# Patient Record
Sex: FEMALE | Race: AMERICAN INDIAN OR ALASKA NATIVE | URBAN - METROPOLITAN AREA
[De-identification: names, ages, dates, MRNs, and addresses within clinical notes are randomized per-mention and may not be internally consistent; named-entity substitution may affect disease eponyms.]

---

## 2020-04-30 ENCOUNTER — APPOINTMENT (RX ONLY)
Dept: URBAN - METROPOLITAN AREA CLINIC 158 | Facility: CLINIC | Age: 60
Setting detail: DERMATOLOGY
End: 2020-04-30

## 2020-04-30 DIAGNOSIS — L92.0 GRANULOMA ANNULARE: ICD-10-CM

## 2020-04-30 DIAGNOSIS — L40.3 PUSTULOSIS PALMARIS ET PLANTARIS: ICD-10-CM

## 2020-04-30 DIAGNOSIS — L40.0 PSORIASIS VULGARIS: ICD-10-CM

## 2020-04-30 PROCEDURE — ? COUNSELING

## 2020-04-30 PROCEDURE — 99202 OFFICE O/P NEW SF 15 MIN: CPT

## 2020-04-30 PROCEDURE — ? PATIENT SPECIFIC COUNSELING

## 2020-04-30 PROCEDURE — ? PRESCRIPTION

## 2020-04-30 PROCEDURE — ? EDUCATIONAL RESOURCES PROVIDED

## 2020-04-30 RX ORDER — HALOBETASOL PROPIONATE AND TAZAROTENE .1; .45 MG/G; MG/G
1 LOTION TOPICAL ONCE A DAY
Qty: 1 | Refills: 3 | Status: ERX | COMMUNITY
Start: 2020-04-30

## 2020-04-30 RX ADMIN — HALOBETASOL PROPIONATE AND TAZAROTENE 1: .1; .45 LOTION TOPICAL at 00:00

## 2020-04-30 ASSESSMENT — PGA PSORIASIS: PGA PSORIASIS 2020: MILD

## 2020-04-30 ASSESSMENT — LOCATION DETAILED DESCRIPTION DERM
LOCATION DETAILED: LEFT HYPOTHENAR EMINENCE
LOCATION DETAILED: RIGHT RADIAL DORSAL HAND
LOCATION DETAILED: RIGHT ULNAR DORSAL HAND
LOCATION DETAILED: RIGHT DORSAL FOOT
LOCATION DETAILED: LEFT THENAR EMINENCE
LOCATION DETAILED: RIGHT MEDIAL PLANTAR MIDFOOT
LOCATION DETAILED: RIGHT HYPOTHENAR EMINENCE
LOCATION DETAILED: RIGHT ULNAR PALM
LOCATION DETAILED: LEFT RADIAL PALM
LOCATION DETAILED: RIGHT INSTEP
LOCATION DETAILED: RIGHT THENAR EMINENCE
LOCATION DETAILED: LEFT ULNAR PALM
LOCATION DETAILED: RIGHT RADIAL PALM

## 2020-04-30 ASSESSMENT — BSA PSORIASIS: % BODY COVERED IN PSORIASIS: 4

## 2020-04-30 ASSESSMENT — LOCATION ZONE DERM
LOCATION ZONE: FEET
LOCATION ZONE: HAND

## 2020-04-30 ASSESSMENT — LOCATION SIMPLE DESCRIPTION DERM
LOCATION SIMPLE: RIGHT HAND
LOCATION SIMPLE: RIGHT PLANTAR SURFACE
LOCATION SIMPLE: LEFT HAND
LOCATION SIMPLE: RIGHT FOOT

## 2020-04-30 NOTE — PROCEDURE: PATIENT SPECIFIC COUNSELING
Detail Level: Detailed
She may use Duobrii lotion on GA on the right dorsal hand as well.
Patient reports that rash on hands and feet has been ongoing for over 1 year and has gotten progressively worse.  Condition has not been responsive to various OTCs including antifungal and silvadene. She has not had any prescription treatment.  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  I recommend start treatment with Duobrii lotion.  Samples given and prescription will be sent to [facility] pharmacy.  Return to clinic in 1 month.
There is one small psoriasis patch on the right dorsal foot. She may apply the Duobrii lotion to this site as well.

## 2020-05-28 ENCOUNTER — APPOINTMENT (RX ONLY)
Dept: URBAN - METROPOLITAN AREA CLINIC 158 | Facility: CLINIC | Age: 60
Setting detail: DERMATOLOGY
End: 2020-05-28

## 2020-05-28 DIAGNOSIS — L92.0 GRANULOMA ANNULARE: ICD-10-CM

## 2020-05-28 DIAGNOSIS — L40.0 PSORIASIS VULGARIS: ICD-10-CM

## 2020-05-28 DIAGNOSIS — L40.3 PUSTULOSIS PALMARIS ET PLANTARIS: ICD-10-CM | Status: INADEQUATELY CONTROLLED

## 2020-05-28 PROCEDURE — ? PATIENT SPECIFIC COUNSELING

## 2020-05-28 PROCEDURE — ? ORDER TESTS

## 2020-05-28 PROCEDURE — ? EDUCATIONAL RESOURCES PROVIDED

## 2020-05-28 PROCEDURE — ? COUNSELING

## 2020-05-28 PROCEDURE — 99213 OFFICE O/P EST LOW 20 MIN: CPT

## 2020-05-28 PROCEDURE — ? PRESCRIPTION

## 2020-05-28 ASSESSMENT — LOCATION ZONE DERM
LOCATION ZONE: FEET
LOCATION ZONE: HAND

## 2020-05-28 ASSESSMENT — LOCATION DETAILED DESCRIPTION DERM
LOCATION DETAILED: LEFT ULNAR PALM
LOCATION DETAILED: RIGHT ULNAR DORSAL HAND
LOCATION DETAILED: RIGHT ULNAR PALM
LOCATION DETAILED: RIGHT MEDIAL PLANTAR MIDFOOT
LOCATION DETAILED: RIGHT HYPOTHENAR EMINENCE
LOCATION DETAILED: LEFT HYPOTHENAR EMINENCE
LOCATION DETAILED: LEFT RADIAL PALM
LOCATION DETAILED: RIGHT RADIAL DORSAL HAND
LOCATION DETAILED: RIGHT INSTEP
LOCATION DETAILED: RIGHT RADIAL PALM
LOCATION DETAILED: LEFT THENAR EMINENCE
LOCATION DETAILED: RIGHT DORSAL FOOT
LOCATION DETAILED: RIGHT THENAR EMINENCE

## 2020-05-28 ASSESSMENT — LOCATION SIMPLE DESCRIPTION DERM
LOCATION SIMPLE: RIGHT FOOT
LOCATION SIMPLE: RIGHT PLANTAR SURFACE
LOCATION SIMPLE: LEFT HAND
LOCATION SIMPLE: RIGHT HAND

## 2020-05-28 NOTE — PROCEDURE: PATIENT SPECIFIC COUNSELING
Carry forward from 4/30/2020: Patient reports that rash on hands and feet has been ongoing for over 1 year and has gotten progressively worse.  Condition has not been responsive to various OTCs including antifungal and silvadene. She has not had any prescription treatment.  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  I recommend start treatment with Duobrii lotion.  Samples given and prescription will be sent to [facility] pharmacy.  Return to clinic in 1 month.\\n\\nTADELE's note (May 28 2020):  PPP, psoriasis, and GA on her hands and feet are minimally better with Duobrii lotion over the past month.  I recommend starting treatment with acitretin 25mg QD.  Patient report that she recently had blood test ordered by her PCP.  She states that he cholesterol and TGs are mild elevated and that she has been told that she has a fatty liver.  I will have patient sign a medical release for her PCP to get most recent CBC, CMP, and lipid panel (for baseline).  I will have her get hepatic panel and LFTs in 2-3 week after starting acitretin.  Return to clinic in 1 month.
Detail Level: Detailed
Carry forward from 4/30/2020: She may use Duobrii lotion on GA on the right dorsal hand as well.
Carry forward from 4/30/2020: There is one small psoriasis patch on the right dorsal foot. She may apply the Duobrii lotion to this site as well.

## 2020-06-25 ENCOUNTER — APPOINTMENT (RX ONLY)
Dept: URBAN - METROPOLITAN AREA CLINIC 158 | Facility: CLINIC | Age: 60
Setting detail: DERMATOLOGY
End: 2020-06-25

## 2020-06-25 DIAGNOSIS — L40.3 PUSTULOSIS PALMARIS ET PLANTARIS: ICD-10-CM

## 2020-06-25 DIAGNOSIS — L92.0 GRANULOMA ANNULARE: ICD-10-CM

## 2020-06-25 PROCEDURE — 99213 OFFICE O/P EST LOW 20 MIN: CPT

## 2020-06-25 PROCEDURE — ? PATIENT SPECIFIC COUNSELING

## 2020-06-25 PROCEDURE — ? ORDER TESTS

## 2020-06-25 PROCEDURE — ? COUNSELING

## 2020-06-25 ASSESSMENT — LOCATION ZONE DERM
LOCATION ZONE: FEET
LOCATION ZONE: HAND

## 2020-06-25 ASSESSMENT — LOCATION SIMPLE DESCRIPTION DERM
LOCATION SIMPLE: RIGHT HAND
LOCATION SIMPLE: LEFT HAND
LOCATION SIMPLE: RIGHT PLANTAR SURFACE

## 2020-06-25 ASSESSMENT — LOCATION DETAILED DESCRIPTION DERM
LOCATION DETAILED: LEFT ULNAR PALM
LOCATION DETAILED: LEFT THENAR EMINENCE
LOCATION DETAILED: RIGHT ULNAR DORSAL HAND
LOCATION DETAILED: LEFT RADIAL PALM
LOCATION DETAILED: RIGHT THENAR EMINENCE
LOCATION DETAILED: RIGHT INSTEP
LOCATION DETAILED: RIGHT RADIAL DORSAL HAND
LOCATION DETAILED: RIGHT HYPOTHENAR EMINENCE
LOCATION DETAILED: RIGHT ULNAR PALM
LOCATION DETAILED: RIGHT RADIAL PALM
LOCATION DETAILED: RIGHT MEDIAL PLANTAR MIDFOOT
LOCATION DETAILED: LEFT HYPOTHENAR EMINENCE

## 2020-06-25 NOTE — PROCEDURE: PATIENT SPECIFIC COUNSELING
Carry forward from 4/30/2020: Patient reports that rash on hands and feet has been ongoing for over 1 year and has gotten progressively worse.  Condition has not been responsive to various OTCs including antifungal and silvadene. She has not had any prescription treatment.  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  I recommend start treatment with Duobrii lotion.  Samples given and prescription will be sent to [facility] pharmacy.  Return to clinic in 1 month.\\n\\nCarry forward from (May 28 2020):  PPP, psoriasis, and GA on her hands and feet are minimally better with Duobrii lotion over the past month.  I recommend starting treatment with acitretin 25mg QD.  Patient report that she recently had blood test ordered by her PCP.  She states that he cholesterol and TGs are mild elevated and that she has been told that she has a fatty liver.  I will have patient sign a medical release for her PCP to get most recent CBC, CMP, and lipid panel (for baseline).  I will have her get hepatic panel and LFTs in 2-3 week after starting acitretin.  Return to clinic in 1 month.\\n\\nPhone conversation with patient on 6/23/2020: I talked with patient regarding her blood test results which she had drawn on 6/18/2020 that showed TG level of 1133. She actually had stopped the medication the day after she got blood test because of extreme chapped lips and dry hands. She has not had any nausea, vomiting, or severe abdominal pain. However, she had been feeling \"blah\" and fatigue. I told her to remain off the acitretin. We will have to recheck her TG next week to ensure that it is trending down. I asked her to decrease fattty food for the next 2 weeks. She reports that the pustles on her feet are actually cleared but she still has redness and psoriasis patches. She does have a follow-up appointment with me on 6/25/20 which I told her to keep. Will consider Otezla.\\n\\nTODAY's note (Jun 25 2020):  Exam of hand and feet reveal some recurrence of psoriasis and pustulosis on the palms and sole.  She has been off acitretin for about 1 week and this is the first day that she does not feel \"blah\".  I will have patient get lipid panel and LFTs next week to make sure TGs are coming down.  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  Three sample packet given.  Side effects reviewed including nausea, vomiting, diarrhea, headaches, depression, and weight loss.  She can continue with Duobrii lotion topically.  Return to clinic in 1 month.
Detail Level: Detailed
Carry forward from 4/30/2020: She may use Duobrii lotion on GA on the right dorsal hand as well.\\n\\nTADELE's note (Jun 25 2020):  Continue with Duobrii lotion topically.

## 2020-07-27 ENCOUNTER — APPOINTMENT (RX ONLY)
Dept: URBAN - METROPOLITAN AREA CLINIC 158 | Facility: CLINIC | Age: 60
Setting detail: DERMATOLOGY
End: 2020-07-27

## 2020-07-27 DIAGNOSIS — L92.0 GRANULOMA ANNULARE: ICD-10-CM

## 2020-07-27 DIAGNOSIS — L40.3 PUSTULOSIS PALMARIS ET PLANTARIS: ICD-10-CM

## 2020-07-27 PROCEDURE — ? COUNSELING

## 2020-07-27 PROCEDURE — 99213 OFFICE O/P EST LOW 20 MIN: CPT

## 2020-07-27 PROCEDURE — ? PATIENT SPECIFIC COUNSELING

## 2020-07-27 ASSESSMENT — LOCATION ZONE DERM
LOCATION ZONE: FEET
LOCATION ZONE: HAND

## 2020-07-27 ASSESSMENT — LOCATION DETAILED DESCRIPTION DERM
LOCATION DETAILED: LEFT HYPOTHENAR EMINENCE
LOCATION DETAILED: RIGHT INSTEP
LOCATION DETAILED: LEFT ULNAR PALM
LOCATION DETAILED: RIGHT ULNAR PALM
LOCATION DETAILED: RIGHT RADIAL PALM
LOCATION DETAILED: RIGHT RADIAL DORSAL HAND
LOCATION DETAILED: LEFT RADIAL PALM
LOCATION DETAILED: RIGHT THENAR EMINENCE
LOCATION DETAILED: RIGHT ULNAR DORSAL HAND
LOCATION DETAILED: RIGHT HYPOTHENAR EMINENCE
LOCATION DETAILED: RIGHT MEDIAL PLANTAR MIDFOOT
LOCATION DETAILED: LEFT THENAR EMINENCE

## 2020-07-27 ASSESSMENT — LOCATION SIMPLE DESCRIPTION DERM
LOCATION SIMPLE: LEFT HAND
LOCATION SIMPLE: RIGHT HAND
LOCATION SIMPLE: RIGHT PLANTAR SURFACE

## 2020-07-27 NOTE — PROCEDURE: PATIENT SPECIFIC COUNSELING
Carry forward from 4/30/2020: Patient reports that rash on hands and feet has been ongoing for over 1 year and has gotten progressively worse.  Condition has not been responsive to various OTCs including antifungal and silvadene. She has not had any prescription treatment.  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  I recommend start treatment with Duobrii lotion.  Samples given and prescription will be sent to [facility] pharmacy.  Return to clinic in 1 month.\\n\\nCarry forward from (May 28 2020):  PPP, psoriasis, and GA on her hands and feet are minimally better with Duobrii lotion over the past month.  I recommend starting treatment with acitretin 25mg QD.  Patient report that she recently had blood test ordered by her PCP.  She states that he cholesterol and TGs are mild elevated and that she has been told that she has a fatty liver.  I will have patient sign a medical release for her PCP to get most recent CBC, CMP, and lipid panel (for baseline).  I will have her get hepatic panel and LFTs in 2-3 week after starting acitretin.  Return to clinic in 1 month.\\n\\nPhone conversation with patient on 6/23/2020: I talked with patient regarding her blood test results which she had drawn on 6/18/2020 that showed TG level of 1133. She actually had stopped the medication the day after she got blood test because of extreme chapped lips and dry hands. She has not had any nausea, vomiting, or severe abdominal pain. However, she had been feeling \"blah\" and fatigue. I told her to remain off the acitretin. We will have to recheck her TG next week to ensure that it is trending down. I asked her to decrease fattty food for the next 2 weeks. She reports that the pustles on her feet are actually cleared but she still has redness and psoriasis patches. She does have a follow-up appointment with me on 6/25/20 which I told her to keep. Will consider Otezla.\\n\\nCarry forward from (Jun 25 2020):  Exam of hand and feet reveal some recurrence of psoriasis and pustulosis on the palms and sole.  She has been off acitretin for about 1 week and this is the first day that she does not feel \"blah\".  I will have patient get lipid panel and LFTs next week to make sure TGs are coming down.  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  Three sample packet given.  Side effects reviewed including nausea, vomiting, diarrhea, headaches, depression, and weight loss.  She can continue with Duobrii lotion topically.  Return to clinic in 1 month.\\n\\nTODAY's note (Jul 27 2020):  Patient notes that her palmoplantar psoriasis did imporved over the past month with taking the Otzela samples provided at visit.  She had missed 4 days of taking the medication last week because she had forgotten to place the pills in her pill box and her palms started to form mulitple pustules.  The eruption has not started to recede since she had restarted the medication 3 days ago.  She denies any severe constatnt headaches, diarrhea, nausea, depression, or weight loss.  I will submit prescription and prior authorization form to [facility] specialty pharmacy of [facility].  She can continue Duobrii lotion topically.  Review of lipid panel drawn on 7/2/2020 show reduction of TGs down to 315 (from 1133 on 6/18/2020).  Return to clinic in 3 months assuming that Otezla will be approved.
Detail Level: Detailed
Carry forward from 4/30/2020: She may use Duobrii lotion on GA on the right dorsal hand as well.\\n\\nCarry forward from (Jun 25 2020):  Continue with Duobrii lotion topically.\\n\\nTODAY's note (Jul 27 2020): There is reduction of GA on the hands (likely a response to Otezla). Continue with Duobrii lotion topically.\\n

## 2020-07-29 ENCOUNTER — RX ONLY (OUTPATIENT)
Age: 60
Setting detail: RX ONLY
End: 2020-07-29

## 2020-07-29 RX ORDER — APREMILAST 30 MG/1
1 TABLET, FILM COATED ORAL BID
Qty: 180 | Refills: 4 | COMMUNITY
Start: 2020-07-29

## 2020-10-29 ENCOUNTER — APPOINTMENT (RX ONLY)
Dept: URBAN - METROPOLITAN AREA CLINIC 158 | Facility: CLINIC | Age: 60
Setting detail: DERMATOLOGY
End: 2020-10-29

## 2020-10-29 DIAGNOSIS — L40.3 PUSTULOSIS PALMARIS ET PLANTARIS: ICD-10-CM

## 2020-10-29 PROCEDURE — ? COUNSELING

## 2020-10-29 PROCEDURE — 99213 OFFICE O/P EST LOW 20 MIN: CPT

## 2020-10-29 PROCEDURE — ? PATIENT SPECIFIC COUNSELING

## 2020-10-29 ASSESSMENT — LOCATION DETAILED DESCRIPTION DERM
LOCATION DETAILED: LEFT RADIAL PALM
LOCATION DETAILED: RIGHT MEDIAL PLANTAR MIDFOOT
LOCATION DETAILED: RIGHT THENAR EMINENCE
LOCATION DETAILED: RIGHT ULNAR PALM
LOCATION DETAILED: LEFT ULNAR PALM
LOCATION DETAILED: LEFT THENAR EMINENCE
LOCATION DETAILED: LEFT HYPOTHENAR EMINENCE
LOCATION DETAILED: RIGHT RADIAL PALM
LOCATION DETAILED: RIGHT INSTEP
LOCATION DETAILED: RIGHT HYPOTHENAR EMINENCE

## 2020-10-29 ASSESSMENT — LOCATION ZONE DERM
LOCATION ZONE: FEET
LOCATION ZONE: HAND

## 2020-10-29 ASSESSMENT — LOCATION SIMPLE DESCRIPTION DERM
LOCATION SIMPLE: RIGHT HAND
LOCATION SIMPLE: LEFT HAND
LOCATION SIMPLE: RIGHT PLANTAR SURFACE

## 2020-10-29 NOTE — PROCEDURE: PATIENT SPECIFIC COUNSELING
Carry forward from 4/30/2020: Patient reports that rash on hands and feet has been ongoing for over 1 year and has gotten progressively worse.  Condition has not been responsive to various OTCs including antifungal and silvadene. She has not had any prescription treatment.  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  I recommend start treatment with Duobrii lotion.  Samples given and prescription will be sent to [facility] pharmacy.  Return to clinic in 1 month.\\n\\nCarry forward from (May 28 2020):  PPP, psoriasis, and GA on her hands and feet are minimally better with Duobrii lotion over the past month.  I recommend starting treatment with acitretin 25mg QD.  Patient report that she recently had blood test ordered by her PCP.  She states that he cholesterol and TGs are mild elevated and that she has been told that she has a fatty liver.  I will have patient sign a medical release for her PCP to get most recent CBC, CMP, and lipid panel (for baseline).  I will have her get hepatic panel and LFTs in 2-3 week after starting acitretin.  Return to clinic in 1 month.\\n\\nPhone conversation with patient on 6/23/2020: I talked with patient regarding her blood test results which she had drawn on 6/18/2020 that showed TG level of 1133. She actually had stopped the medication the day after she got blood test because of extreme chapped lips and dry hands. She has not had any nausea, vomiting, or severe abdominal pain. However, she had been feeling \"blah\" and fatigue. I told her to remain off the acitretin. We will have to recheck her TG next week to ensure that it is trending down. I asked her to decrease fattty food for the next 2 weeks. She reports that the pustles on her feet are actually cleared but she still has redness and psoriasis patches. She does have a follow-up appointment with me on 6/25/20 which I told her to keep. Will consider Otezla.\\n\\nCarry forward from (Jun 25 2020):  Exam of hand and feet reveal some recurrence of psoriasis and pustulosis on the palms and sole.  She has been off acitretin for about 1 week and this is the first day that she does not feel \"blah\".  I will have patient get lipid panel and LFTs next week to make sure TGs are coming down.  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  Three sample packet given.  Side effects reviewed including nausea, vomiting, diarrhea, headaches, depression, and weight loss.  She can continue with Duobrii lotion topically.  Return to clinic in 1 month.\\n\\nCarry forward from (Jul 27 2020):  Patient notes that her palmoplantar psoriasis did imporved over the past month with taking the Otzela samples provided at visit.  She had missed 4 days of taking the medication last week because she had forgotten to place the pills in her pill box and her palms started to form mulitple pustules.  The eruption has not started to recede since she had restarted the medication 3 days ago.  She denies any severe constatnt headaches, diarrhea, nausea, depression, or weight loss.  I will submit prescription and prior authorization form to [facility] specialty pharmacy of [facility].  She can continue Duobrii lotion topically.  Review of lipid panel drawn on 7/2/2020 show reduction of TGs down to 315 (from 1133 on 6/18/2020).  Return to clinic in 3 months assuming that Otezla will be approved.\\n\\nTODAY's note (Oct 29 2020): Condition is well control on Otzela 30mg BID.  She is also using Hemp Tea tree soap and Mitlow Herbal Balm topically which she find very soothing.  Otezla approved through 7/29/2021 per [employer].  She denies any depression, wt loss, severe constant headaches, or diarrhea.  Return to clinic in Jun 2021 prior to sunset of prior authorization of Otzela.
Detail Level: Detailed

## 2021-08-17 ENCOUNTER — RX ONLY (OUTPATIENT)
Age: 61
Setting detail: RX ONLY
End: 2021-08-17

## 2021-08-17 RX ORDER — APREMILAST 30 MG/1
1 TABLET, FILM COATED ORAL BID
Qty: 180 | Refills: 1

## 2025-06-02 ENCOUNTER — APPOINTMENT (OUTPATIENT)
Dept: URBAN - METROPOLITAN AREA CLINIC 158 | Facility: CLINIC | Age: 65
Setting detail: DERMATOLOGY
End: 2025-06-02

## 2025-06-02 DIAGNOSIS — L40.0 PSORIASIS VULGARIS: ICD-10-CM | Status: INADEQUATELY CONTROLLED

## 2025-06-02 PROCEDURE — ? PATIENT SPECIFIC COUNSELING

## 2025-06-02 PROCEDURE — ? PRESCRIPTION

## 2025-06-02 PROCEDURE — 99204 OFFICE O/P NEW MOD 45 MIN: CPT

## 2025-06-02 RX ORDER — BETAMETHASONE DIPROPIONATE 0.5 MG/G
1 CREAM TOPICAL
Qty: 45 | Refills: 6 | Status: ERX | COMMUNITY
Start: 2025-06-02

## 2025-06-02 RX ORDER — ROFLUMILAST 500 UG/1
1 TABLET ORAL QD
Qty: 30 | Refills: 6 | Status: ERX | COMMUNITY
Start: 2025-06-02

## 2025-06-02 RX ADMIN — BETAMETHASONE DIPROPIONATE 1: 0.5 CREAM TOPICAL at 00:00

## 2025-06-02 RX ADMIN — ROFLUMILAST 1: 500 TABLET ORAL at 00:00

## 2025-06-02 ASSESSMENT — LOCATION ZONE DERM
LOCATION ZONE: LEG
LOCATION ZONE: FEET
LOCATION ZONE: HAND

## 2025-06-02 ASSESSMENT — LOCATION SIMPLE DESCRIPTION DERM
LOCATION SIMPLE: LEFT HAND
LOCATION SIMPLE: RIGHT PRETIBIAL REGION
LOCATION SIMPLE: LEFT FOOT
LOCATION SIMPLE: RIGHT FOOT
LOCATION SIMPLE: RIGHT HAND
LOCATION SIMPLE: LEFT PRETIBIAL REGION

## 2025-06-02 ASSESSMENT — LOCATION DETAILED DESCRIPTION DERM
LOCATION DETAILED: LEFT DISTAL PRETIBIAL REGION
LOCATION DETAILED: RIGHT ULNAR DORSAL HAND
LOCATION DETAILED: LEFT DORSAL FOOT
LOCATION DETAILED: RIGHT DORSAL FOOT
LOCATION DETAILED: LEFT RADIAL DORSAL HAND
LOCATION DETAILED: RIGHT PROXIMAL PRETIBIAL REGION

## 2025-06-02 ASSESSMENT — PGA PSORIASIS: PGA PSORIASIS 2020: MODERATE

## 2025-06-02 ASSESSMENT — ITCH NUMERIC RATING SCALE: HOW SEVERE IS YOUR ITCHING?: 5

## 2025-06-02 NOTE — PROCEDURE: PATIENT SPECIFIC COUNSELING
Carry forward from 4/30/2020: Patient reports that rash on hands and feet has been ongoing for over 1 year and has gotten progressively worse.  Condition has not been responsive to various OTCs including antifungal and silvadene. She has not had any prescription treatment.  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  I recommend start treatment with Duobrii lotion.  Samples given and prescription will be sent to [facility] pharmacy.  Return to clinic in 1 month.\\n\\nCarry forward from (May 28 2020):  PPP, psoriasis, and GA on her hands and feet are minimally better with Duobrii lotion over the past month.  I recommend starting treatment with acitretin 25mg QD.  Patient report that she recently had blood test ordered by her PCP.  She states that he cholesterol and TGs are mild elevated and that she has been told that she has a fatty liver.  I will have patient sign a medical release for her PCP to get most recent CBC, CMP, and lipid panel (for baseline).  I will have her get hepatic panel and LFTs in 2-3 week after starting acitretin.  Return to clinic in 1 month.\\n\\nPhone conversation with patient on 6/23/2020: I talked with patient regarding her blood test results which she had drawn on 6/18/2020 that showed TG level of 1133. She actually had stopped the medication the day after she got blood test because of extreme chapped lips and dry hands. She has not had any nausea, vomiting, or severe abdominal pain. However, she had been feeling \"blah\" and fatigue. I told her to remain off the acitretin. We will have to recheck her TG next week to ensure that it is trending down. I asked her to decrease fattty food for the next 2 weeks. She reports that the pustles on her feet are actually cleared but she still has redness and psoriasis patches. She does have a follow-up appointment with me on 6/25/20 which I told her to keep. Will consider Otezla.\\n\\nCarry forward from (Jun 25 2020):  Exam of hand and feet reveal some recurrence of psoriasis and pustulosis on the palms and sole.  She has been off acitretin for about 1 week and this is the first day that she does not feel \"blah\".  I will have patient get lipid panel and LFTs next week to make sure TGs are coming down.  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  Three sample packet given.  Side effects reviewed including nausea, vomiting, diarrhea, headaches, depression, and weight loss.  She can continue with Duobrii lotion topically.  Return to clinic in 1 month.\\n\\nCarry forward from (Jul 27 2020):  Patient notes that her palmoplantar psoriasis did imporved over the past month with taking the Otzela samples provided at visit.  She had missed 4 days of taking the medication last week because she had forgotten to place the pills in her pill box and her palms started to form mulitple pustules.  The eruption has not started to recede since she had restarted the medication 3 days ago.  She denies any severe constatnt headaches, diarrhea, nausea, depression, or weight loss.  I will submit prescription and prior authorization form to [facility] specialty pharmacy of [facility].  She can continue Duobrii lotion topically.  Review of lipid panel drawn on 7/2/2020 show reduction of TGs down to 315 (from 1133 on 6/18/2020).  Return to clinic in 3 months assuming that Otezla will be approved.\\n\\nCarry forward from (Oct 29 2020): Condition is well control on Otzela 30mg BID.  She is also using Hemp Tea tree soap and Mitlow Herbal Balm topically which she find very soothing.  Otezla approved through 7/29/2021 per [employer].  She denies any depression, wt loss, severe constant headaches, or diarrhea.  Return to clinic in Jun 2021 prior to sunset of prior authorization of Otzela.\\n\\nTODAY's note (Jun 02 2025): Psoriasis is flaring over the hands, feet, and lower legs for several months. Otezla had worked well, but she had been off Otezla for a few years because she no longer qualified for the PAP financial assistance program, and she could not afford the co-pay. I discussed alternative treatment with off-label use of oral generic roflumilast 500 mcg QD.  Oral roflumilast is FDA-indicated for the treatment of COPD, but it has the same MOA as Otezla, so it should work as well as Otezla.  Oral roflumilast is $10 per month if she uses [facility] pharmacy. I will also start her on betamethasone dipropionate 0.05% cream BID.  Return to the clinic in 2 months.
Detail Level: Simple